# Patient Record
Sex: FEMALE | Race: WHITE | Employment: OTHER | URBAN - METROPOLITAN AREA
[De-identification: names, ages, dates, MRNs, and addresses within clinical notes are randomized per-mention and may not be internally consistent; named-entity substitution may affect disease eponyms.]

---

## 2019-10-23 ENCOUNTER — THERAPY VISIT (OUTPATIENT)
Dept: PHYSICAL THERAPY | Facility: CLINIC | Age: 84
End: 2019-10-23
Payer: COMMERCIAL

## 2019-10-23 DIAGNOSIS — M25.561 CHRONIC PAIN OF RIGHT KNEE: ICD-10-CM

## 2019-10-23 DIAGNOSIS — G89.29 CHRONIC PAIN OF RIGHT KNEE: ICD-10-CM

## 2019-10-23 PROCEDURE — 97161 PT EVAL LOW COMPLEX 20 MIN: CPT | Mod: GP | Performed by: PHYSICAL THERAPIST

## 2019-10-23 PROCEDURE — 97110 THERAPEUTIC EXERCISES: CPT | Mod: GP | Performed by: PHYSICAL THERAPIST

## 2019-10-23 PROCEDURE — 97112 NEUROMUSCULAR REEDUCATION: CPT | Mod: GP | Performed by: PHYSICAL THERAPIST

## 2019-10-23 NOTE — PROGRESS NOTES
[facility] Initial Evaluation  Subjective:    Type of problem:  Right knee   Condition occurred with:  Degenerative joint disease.    Problem details: MD order 10/16/19. Cayla has been having right knee pain for the past few years. She was diagnosed with DJD which has gotten worse over the years. She lives in the cooperative building and needs to learn exercises that she can do. She has tried uses braces to help with pain- some days it feels better and other days worse. She felt catching in her knee and lose balance those times  She has been more mindful of what she has been doing so far. She sleeps in certain positions to prevent waking up with pain. She met with her provider and was sent to PT for further management. .   Patient reports pain:  Posterior, medial, lateral and anterior. Radiates to:  Lower leg. Associated symptoms:  Buckling/giving out, loss of motion/stiffness, loss of strength and catching. Symptoms are exacerbated by bending/squatting, ascending stairs, descending stairs and walking Relieved by: tylenol.    Cayla Hurtado being seen for right knee pain.   Date of Onset: few years ago. Problem occurred: DJD  and reported as 2/10 on pain scale. General health as reported by patient is good. Pertinent medical history includes:  High blood pressure and overweight.    Surgeries include:  None.  Current medications:  High blood pressure medication. Other medications details: cholestrol, gout.   Primary job tasks include:  Repetitive tasks.  Pain is described as aching and sharp  Pain is worse during the day. Since onset symptoms are gradually worsening. Special tests:  X-ray.     Patient is retired.   Barriers include:  None as reported by patient.  Red flags:  None as reported by patient.                      Objective:  System                                           Hip Evaluation    Hip Strength:    Flexion:   Left: 3+/5   Pain:  Right: 3-/5    Pain: weak/painful                     Extension:  Left: 4/5  Pain:Right: 3+/5    Pain:    Abduction:  Left: 3+/5     Pain:Right: 3-/5   ++   Pain:  Adduction:  Left: 5/5    Pain:Right: 5/5   Pain:  Internal Rotation:  Left: 5/5    Pain:Right: 5/5   Pain:  External Rotation:  Left: 5/5   Pain:  Right: 5/5   Pain:                     Knee Evaluation:  ROM:    AROM      Extension:  Left: 0    Right:  4 degree extension lag  Flexion: Left: 120    Right: 105        Strength:     Extension:  Left: 5/5   Pain:      Right: 4+/5    Pain:+  Flexion:  Left: 5/5   Pain:      Right: 5/5   Pain:    Quad Set Left: Good    Pain:   Quad Set Right: Fair    Pain:      Palpation:      Right knee tenderness present at:  Medial Joint Line and Lateral Joint Line            General     ROS    Assessment/Plan:    Patient is a 84 year old female with right side knee complaints.    Patient has the following significant findings with corresponding treatment plan.                Diagnosis 1:  Right knee pain- OA  Pain -  hot/cold therapy, US, electric stimulation, manual therapy, STS, self management, education and home program  Decreased ROM/flexibility - manual therapy, therapeutic exercise, therapeutic activity and home program  Decreased strength - therapeutic exercise, therapeutic activities and home program  Edema - electric stimulation, cold therapy and self management/home program  Decreased function - therapeutic activities and home program  Instability -  Therapeutic Activity  Therapeutic Exercise  Neuromuscular Re-education  home program    Therapy Evaluation Codes:   1) History comprised of:   Personal factors that impact the plan of care:      Past/current experiences and Time since onset of symptoms.    Comorbidity factors that impact the plan of care are:      Check HPI.     Medications impacting care: Check HPI.  2) Examination of Body Systems comprised of:   Body structures and functions that impact the plan of care:      Knee.   Activity limitations that  impact the plan of care are:      Dressing, Lifting, Squatting/kneeling, Stairs and Standing.  3) Clinical presentation characteristics are:   Stable/Uncomplicated.  4) Decision-Making    Low complexity using standardized patient assessment instrument and/or measureable assessment of functional outcome.  Cumulative Therapy Evaluation is: Low complexity.    Previous and current functional limitations:  (See Goal Flow Sheet for this information)    Short term and Long term goals: (See Goal Flow Sheet for this information)     Communication ability:  Patient appears to be able to clearly communicate and understand verbal and written communication and follow directions correctly.  Treatment Explanation - The following has been discussed with the patient:   RX ordered/plan of care  Anticipated outcomes  Possible risks and side effects  This patient would benefit from PT intervention to resume normal activities.   Rehab potential is good.    Frequency:  1 X week, once daily  Duration:  for 6 weeks  Discharge Plan:  Achieve all LTG.  Independent in home treatment program.  Reach maximal therapeutic benefit.    Please refer to the daily flowsheet for treatment today, total treatment time and time spent performing 1:1 timed codes.

## 2019-10-23 NOTE — LETTER
[facility]  [address] [place] [id number]  [phone]    2019    Re: Cayla Hurtado   :   1935  MRN:  [id number]   REFERRING PHYSICIAN:   Baltazar Alonso    [facility]    Date of Initial Evaluation:  10/23/2019  Visits:  Rxs Used: 1  Reason for Referral:  Chronic pain of right knee    EVALUATION SUMMARY    [facility] Initial Evaluation  Subjective:    Type of problem:  Right knee   Condition occurred with:  Degenerative joint disease.    Problem details: MD order 10/16/19. Cayla has been having right knee pain for the past few years. She was diagnosed with DJD which has gotten worse over the years. She lives in the [facility] building and needs to learn exercises that she can do. She has tried uses braces to help with pain- some days it feels better and other days worse. She felt catching in her knee and lose balance those times  She has been more mindful of what she has been doing so far. She sleeps in certain positions to prevent waking up with pain. She met with her provider and was sent to PT for further management. .   Patient reports pain:  Posterior, medial, lateral and anterior. Radiates to:  Lower leg. Associated symptoms:  Buckling/giving out, loss of motion/stiffness, loss of strength and catching. Symptoms are exacerbated by bending/squatting, ascending stairs, descending stairs and walking Relieved by: tylenol.  Cayla Hurtado being seen for right knee pain.   Date of Onset: few years ago. Problem occurred: DJD  and reported as 2/10 on pain scale. General health as reported by patient is good. Pertinent medical history includes:  High blood pressure and overweight.    Surgeries include:  None.  Current medications:  High blood pressure medication. Other medications details: cholestrol, gout.   Primary job tasks include:  Repetitive tasks.  Pain is described as aching and sharp  Pain is worse during  the day. Since onset symptoms are gradually worsening. Special tests:  X-ray.     Patient is retired.   Barriers include:  None as reported by patient.  Red flags:  None as reported by patient.    Objective:  System    Hip Evaluation  Hip Strength:    Flexion:   Left: 3+/5   Pain:  Right: 3-/5    Pain: weak/painful     Extension:  Left: 4/5  Pain:Right: 3+/5    Pain:    Abduction:  Left: 3+/5     Pain:Right: 3-/5   ++   Pain:  Adduction:  Left: 5/5    Pain:Right: 5/5   Pain:  Internal Rotation:  Left: 5/5    Pain:Right: 5/5   Pain:  External Rotation:  Left: 5/5   Pain:  Right: 5/5   Pain:    Knee Evaluation:  ROM:    AROM  Extension:  Left: 0    Right:  4 degree extension lag  Re: Cayla Hurtado   :   1935    Flexion: Left: 120    Right: 105  Strength:   Extension:  Left: 5/5   Pain:      Right: 4+/5    Pain:+  Flexion:  Left: 5/5   Pain:      Right: 5/5   Pain:    Quad Set Left: Good    Pain:   Quad Set Right: Fair    Pain:  Palpation:    Right knee tenderness present at:  Medial Joint Line and Lateral Joint Line  General   ROS    Assessment/Plan:    Patient is a 84 year old female with right side knee complaints.    Patient has the following significant findings with corresponding treatment plan.                Diagnosis 1:  Right knee pain- OA  Pain -  hot/cold therapy, US, electric stimulation, manual therapy, STS, self management, education and home program  Decreased ROM/flexibility - manual therapy, therapeutic exercise, therapeutic activity and home program  Decreased strength - therapeutic exercise, therapeutic activities and home program  Edema - electric stimulation, cold therapy and self management/home program  Decreased function - therapeutic activities and home program  Instability -  Therapeutic Activity  Therapeutic Exercise  Neuromuscular Re-education  home program    Therapy Evaluation Codes:   1) History comprised of:   Personal factors that impact the plan of care:      Past/current  experiences and Time since onset of symptoms.    Comorbidity factors that impact the plan of care are:      Check HPI.     Medications impacting care: Check HPI.  2) Examination of Body Systems comprised of:   Body structures and functions that impact the plan of care:      Knee.   Activity limitations that impact the plan of care are:      Dressing, Lifting, Squatting/kneeling, Stairs and Standing.  3) Clinical presentation characteristics are:   Stable/Uncomplicated.  4) Decision-Making    Low complexity using standardized patient assessment instrument and/or measureable assessment of functional outcome.  Cumulative Therapy Evaluation is: Low complexity.    Previous and current functional limitations:  (See Goal Flow Sheet for this information)    Short term and Long term goals: (See Goal Flow Sheet for this information)     Communication ability:  Patient appears to be able to clearly communicate and understand verbal and written communication and follow directions correctly.  Treatment Explanation - The following has been discussed with the patient:   RX ordered/plan of care  Anticipated outcomes  Possible risks and side effects  This patient would benefit from PT intervention to resume normal activities.   Rehab potential is good.  Re: Cayla Hurtado   :   1935    Frequency:  1 X week, once daily  Duration:  for 6 weeks  Discharge Plan:  Achieve all LTG.  Independent in home treatment program.  Reach maximal therapeutic benefit.    Thank you for your referral.    INQUIRIES  Therapist: Fabi Hunter, PT   [address] [address]  Phone: [phone]  Fax: [phone]

## 2019-10-30 ENCOUNTER — THERAPY VISIT (OUTPATIENT)
Dept: PHYSICAL THERAPY | Facility: CLINIC | Age: 84
End: 2019-10-30
Payer: COMMERCIAL

## 2019-10-30 DIAGNOSIS — M25.561 CHRONIC PAIN OF RIGHT KNEE: ICD-10-CM

## 2019-10-30 DIAGNOSIS — G89.29 CHRONIC PAIN OF RIGHT KNEE: ICD-10-CM

## 2019-10-30 PROCEDURE — 97110 THERAPEUTIC EXERCISES: CPT | Mod: GP | Performed by: PHYSICAL THERAPIST

## 2019-10-30 PROCEDURE — 97112 NEUROMUSCULAR REEDUCATION: CPT | Mod: GP | Performed by: PHYSICAL THERAPIST

## 2020-01-10 PROBLEM — M25.561 RIGHT KNEE PAIN: Status: RESOLVED | Noted: 2019-10-23 | Resolved: 2020-01-10

## 2020-01-10 NOTE — PROGRESS NOTES
Discharge Note    Progress reporting period is from last progress note on   to Oct 30, 2019.    Cayla failed to follow up and current status is unknown.  Please see information below for last relevant information on current status.  Patient seen for 2 visits.    SUBJECTIVE  Subjective changes noted by patient:  Cayla mentions she has performed her exercises every day- but started with half the intensity as needed.   .  Current pain level is  .     Previous pain level was   .   Changes in function:  Yes (See Goal flowsheet attached for changes in current functional level)  Adverse reaction to treatment or activity: None    OBJECTIVE  Changes noted in objective findings: Reviewed exercises and advsied to walk for 5-10 min every day , keeping activities level to 30-40 min/ day     ASSESSMENT/PLAN  Diagnosis: R knee pain- OA   Updated problem list and treatment plan:     STG/LTGs have been met or progress has been made towards goals:  Yes, please see goal flowsheet for most current information  Assessment of Progress: current status is unknown.    Last current status:     Self Management Plans:  HEP  I have re-evaluated this patient and find that the nature, scope, duration and intensity of the therapy is appropriate for the medical condition of the patient.  Cayla continues to require the following intervention to meet STG and LTG's:  HEP.    Recommendations:  Discharge with current home program.  Patient to follow up with MD as needed.    Please refer to the daily flowsheet for treatment today, total treatment time and time spent performing 1:1 timed codes.